# Patient Record
Sex: MALE | Race: WHITE | NOT HISPANIC OR LATINO | URBAN - METROPOLITAN AREA
[De-identification: names, ages, dates, MRNs, and addresses within clinical notes are randomized per-mention and may not be internally consistent; named-entity substitution may affect disease eponyms.]

---

## 2017-09-05 ENCOUNTER — OFFICE (OUTPATIENT)
Dept: URBAN - METROPOLITAN AREA CLINIC 67 | Facility: CLINIC | Age: 62
End: 2017-09-05

## 2017-09-05 VITALS
HEIGHT: 70 IN | DIASTOLIC BLOOD PRESSURE: 74 MMHG | SYSTOLIC BLOOD PRESSURE: 113 MMHG | WEIGHT: 199 LBS | HEART RATE: 109 BPM

## 2017-09-05 DIAGNOSIS — Z86.010 PERSONAL HISTORY COLON POLYPS: ICD-10-CM

## 2017-09-05 DIAGNOSIS — Z80.0 FAMILY HISTORY OF MALIGNANT NEOPLASM OF COLON: ICD-10-CM

## 2017-09-05 PROCEDURE — 99203 OFFICE O/P NEW LOW 30 MIN: CPT | Performed by: INTERNAL MEDICINE

## 2017-09-05 NOTE — SERVICEHPINOTES
I had the pleasure of seeing this patient today.As you know, he is a 61-year-old man with a history of colon polyps as well as a family history of colon cancer. His last colonoscopy was over 10 years ago. He comes to see me today for a followup examination. He is completely asymptomatic.He denies any nausea, vomiting, hematemesis, melena, hematochezia, or unintentional weight loss.

## 2017-10-10 ENCOUNTER — AMBULATORY SURGICAL CENTER (OUTPATIENT)
Dept: URBAN - METROPOLITAN AREA SURGERY 42 | Facility: SURGERY | Age: 62
End: 2017-10-10

## 2017-10-10 VITALS
SYSTOLIC BLOOD PRESSURE: 128 MMHG | OXYGEN SATURATION: 98 % | TEMPERATURE: 97.9 F | HEIGHT: 70 IN | RESPIRATION RATE: 14 BRPM | WEIGHT: 190 LBS | DIASTOLIC BLOOD PRESSURE: 76 MMHG | HEART RATE: 89 BPM

## 2017-10-10 DIAGNOSIS — D12.0 BENIGN NEOPLASM OF CECUM: ICD-10-CM

## 2017-10-10 DIAGNOSIS — D12.2 BENIGN NEOPLASM OF ASCENDING COLON: ICD-10-CM

## 2017-10-10 DIAGNOSIS — D12.4 BENIGN NEOPLASM OF DESCENDING COLON: ICD-10-CM

## 2017-10-10 DIAGNOSIS — K64.8 OTHER HEMORRHOIDS: ICD-10-CM

## 2017-10-10 DIAGNOSIS — Z86.010 PERSONAL HISTORY OF COLONIC POLYPS: ICD-10-CM

## 2017-10-10 PROBLEM — D12.3 BENIGN NEOPLASM OF TRANSVERSE COLON: Status: ACTIVE | Noted: 2017-10-10

## 2017-10-10 LAB — SURGICAL: PDF REPORT: (no result)

## 2017-10-10 PROCEDURE — 45380 COLONOSCOPY AND BIOPSY: CPT | Mod: 59 | Performed by: INTERNAL MEDICINE

## 2017-10-10 PROCEDURE — 45385 COLONOSCOPY W/LESION REMOVAL: CPT | Performed by: INTERNAL MEDICINE

## 2018-05-04 ENCOUNTER — HOSPITAL ENCOUNTER (INPATIENT)
Age: 63
LOS: 1 days | Discharge: HOME HEALTH | DRG: 563 | End: 2018-05-05
Payer: COMMERCIAL

## 2018-05-04 DIAGNOSIS — G62.9: ICD-10-CM

## 2018-05-04 DIAGNOSIS — Z98.890: ICD-10-CM
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
## 2018-05-04 DIAGNOSIS — J45.909: ICD-10-CM

## 2018-05-04 DIAGNOSIS — S82.452A: ICD-10-CM

## 2018-05-04 DIAGNOSIS — M25.062: ICD-10-CM

## 2018-05-04 DIAGNOSIS — Z86.19: ICD-10-CM

## 2018-05-04 DIAGNOSIS — I10: ICD-10-CM

## 2018-05-04 DIAGNOSIS — D63.8: ICD-10-CM

## 2018-05-04 DIAGNOSIS — S82.142A: Primary | ICD-10-CM
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
